# Patient Record
Sex: MALE | Race: ASIAN | URBAN - METROPOLITAN AREA
[De-identification: names, ages, dates, MRNs, and addresses within clinical notes are randomized per-mention and may not be internally consistent; named-entity substitution may affect disease eponyms.]

---

## 2022-01-18 ENCOUNTER — TELEPHONE (OUTPATIENT)
Dept: OPHTHALMOLOGY | Facility: CLINIC | Age: 37
End: 2022-01-18

## 2022-01-18 NOTE — TELEPHONE ENCOUNTER
----- Message from Laya Lin sent at 1/18/2022  3:21 PM CST -----  Regarding: appt  Contact: Pt  Need an appt with Dr. Justin guido.     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 Please contact @ [phone]    Please call back today within and hour to schedule. I appreciate your assistance.

## 2022-01-18 NOTE — TELEPHONE ENCOUNTER
Pt wanted [facility] eval but Dr Anderson is not longer taking on new [facility] pts at this time. Gave pt Dr Lopez info